# Patient Record
Sex: MALE | Race: WHITE | Employment: FULL TIME | URBAN - METROPOLITAN AREA
[De-identification: names, ages, dates, MRNs, and addresses within clinical notes are randomized per-mention and may not be internally consistent; named-entity substitution may affect disease eponyms.]

---

## 2020-01-30 ENCOUNTER — APPOINTMENT (OUTPATIENT)
Dept: GENERAL RADIOLOGY | Age: 20
End: 2020-01-30
Attending: PHYSICIAN ASSISTANT
Payer: SELF-PAY

## 2020-01-30 ENCOUNTER — HOSPITAL ENCOUNTER (EMERGENCY)
Age: 20
Discharge: HOME | End: 2020-01-30
Attending: EMERGENCY MEDICINE | Admitting: EMERGENCY MEDICINE
Payer: SELF-PAY

## 2020-01-30 VITALS
HEART RATE: 94 BPM | TEMPERATURE: 98.3 F | WEIGHT: 150 LBS | RESPIRATION RATE: 18 BRPM | HEIGHT: 73 IN | SYSTOLIC BLOOD PRESSURE: 115 MMHG | DIASTOLIC BLOOD PRESSURE: 71 MMHG | OXYGEN SATURATION: 100 % | BODY MASS INDEX: 19.88 KG/M2

## 2020-01-30 DIAGNOSIS — T14.8XXA MUSCLE STRAIN: Primary | ICD-10-CM

## 2020-01-30 DIAGNOSIS — S61.012A LACERATION OF LEFT THUMB WITHOUT FOREIGN BODY WITHOUT DAMAGE TO NAIL, INITIAL ENCOUNTER: ICD-10-CM

## 2020-01-30 PROCEDURE — 71046 X-RAY EXAM CHEST 2 VIEWS: CPT

## 2020-01-30 PROCEDURE — 99283 EMERGENCY DEPT VISIT LOW MDM: CPT

## 2020-01-30 PROCEDURE — 74011250637 HC RX REV CODE- 250/637: Performed by: PHYSICIAN ASSISTANT

## 2020-01-30 RX ORDER — CYCLOBENZAPRINE HCL 5 MG
10 TABLET ORAL 3 TIMES DAILY
Qty: 18 TAB | Refills: 0 | Status: SHIPPED | OUTPATIENT
Start: 2020-01-30 | End: 2020-02-02

## 2020-01-30 RX ORDER — IBUPROFEN 600 MG/1
600 TABLET ORAL
Status: COMPLETED | OUTPATIENT
Start: 2020-01-30 | End: 2020-01-30

## 2020-01-30 RX ORDER — BACITRACIN 500 UNIT/G
1 PACKET (EA) TOPICAL
Status: DISCONTINUED | OUTPATIENT
Start: 2020-01-30 | End: 2020-01-30 | Stop reason: HOSPADM

## 2020-01-30 RX ADMIN — IBUPROFEN 600 MG: 600 TABLET, FILM COATED ORAL at 20:04

## 2020-01-31 NOTE — DISCHARGE INSTRUCTIONS

## 2020-01-31 NOTE — ED TRIAGE NOTES
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 Taken IBU without relief, last dose at noon today. Also c/o left thumb injury, was driving a fence post and hit thumb with sledge hammer, causing fence post to cut left thumb about an hour ago.  States tetanus UTD

## 2020-01-31 NOTE — ED PROVIDER NOTES
[facility] EMERGENCY DEPT    Patient Name: Jeremiah Chen    History of Presenting Illness     Chief Complaint   Patient presents with    Rib Pain    Back Pain    Laceration       21 y.o. male presents to the ED c/o a laceration to his left thumb since 1-2 hours ago. Pt was building a fence when the sledgehammer hit the fencepost into his thumb. States his tetanus is up-to-date. 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 States he feels that this may be a muscle strain. He has not taken anything for symptoms. Denies any numbness, weakness, other injury, SOB, other symptoms. Patient denies any other associated signs or symptoms. Patient denies any other complaints. Nursing notes regarding the HPI and triage nursing notes were reviewed. Prior medical records were reviewed. Current Facility-Administered Medications   Medication Dose Route Frequency Provider Last Rate Last Dose    bacitracin 500 unit/gram packet 1 Packet  1 Packet Topical NOW NIKOS Collazo         Current Outpatient Medications   Medication Sig Dispense Refill    cyclobenzaprine (FLEXERIL) 5 mg tablet Take 2 Tabs by mouth three (3) times daily for 3 days. 18 Tab 0       Past History     Past Medical History:  None     Past Surgical History:  History reviewed. No pertinent surgical history. Family History:  History reviewed. No pertinent family history. Social History:  Social History     Tobacco Use    Smoking status: Not on file   Substance Use Topics    Alcohol use: Not on file    Drug use: Not on file       Allergies:  No Known Allergies    Patient's primary care provider (as noted in EPIC):  None    Review of Systems   Constitutional:  Denies malaise, fever, chills. Chest:  + bilateral rib/chest pain. Cardiac:  Denies chest pain or palpitations.    Respiratory:  Denies cough, wheezing, difficulty breathing, shortness of breath. GI/ABD:  Denies injury, pain, distention, nausea, vomiting, diarrhea. :  Denies injury, pain, dysuria or urgency. Back: + right back/rib pain. Pelvis:  Denies injury or pain. Extremity/MS:  Denies injury or pain. Neuro:  Denies neurologic symptoms/deficits/paresthesias. Skin: + laceration to left thumb. All other systems negative as reviewed. Visit Vitals  /71 (BP 1 Location: Right arm, BP Patient Position: At rest)   Pulse 94   Temp 98.3 °F (36.8 °C)   Resp 18   Ht 6' 1\" (1.854 m)   Wt 68 kg (150 lb)   SpO2 100%   BMI 19.79 kg/m²       PHYSICAL EXAM:    CONSTITUTIONAL:  Alert, in no apparent distress; thin. HEAD:  Normocephalic, atraumatic. EYES:  EOMI. Non-icteric sclera. Normal conjunctiva. ENTM:  Mouth: mucous membranes moist.  NECK:  Supple  RESPIRATORY:  Chest clear, equal breath sounds, good air movement. Without wheezes, rhonchi or rales. CARDIOVASCULAR:  Regular rate and rhythm. No murmurs, rubs, or gallops. GI:  Normal bowel sounds, abdomen soft and non-tender. No rebound or guarding. BACK: Right mid posterolateral ribs extending to lateral aspect with reproducible TTP. CHEST: left inferior lateral ribs with reproducible TTP. UPPER EXT:  Normal inspection. LOWER EXT:  No edema, no calf tenderness. Distal pulses intact. NEURO:  Moves all four extremities, and grossly normal motor exam.  SKIN: No ecchymosis. Left thumb with two 1-2mm superficial wounds present. PSYCH:  Alert and normal affect. IMPRESSION AND MEDICAL DECISION MAKING:  DDx: Strain, sprain, fracture, pneumo, laceration, abrasion, other etiologies. CXR: NAD     Patient does not have any spontaneous pneumo or rib fracture. Likely has muscle strains from hard labor at work. His wounds are superficial and small, do not need any closure. His tetanus is up-to-date. Plan for ibuprofen and Flexeril at home. Patient will follow-up with workplace clinic. Diagnosis:   1.  Muscle strain    2. Laceration of left thumb without foreign body without damage to nail, initial encounter      Disposition: Discharge    Follow-up Information     Follow up With Specialties Details Why Contact Flori Willingham  In 3 days  [address]    [facility] EMERGENCY DEPT Emergency Medicine  If symptoms worsen [id number]  [phone]          Patient's Medications   Start Taking    CYCLOBENZAPRINE (FLEXERIL) 5 MG TABLET    Take 2 Tabs by mouth three (3) times daily for 3 days.    Continue Taking    No medications on file   These Medications have changed    No medications on file   Stop Taking    No medications on file     NIKOS Parr